# Patient Record
Sex: FEMALE | Race: WHITE | NOT HISPANIC OR LATINO | URBAN - METROPOLITAN AREA
[De-identification: names, ages, dates, MRNs, and addresses within clinical notes are randomized per-mention and may not be internally consistent; named-entity substitution may affect disease eponyms.]

---

## 2017-01-06 ENCOUNTER — TRANSFERRED RECORDS (OUTPATIENT)
Dept: HEALTH INFORMATION MANAGEMENT | Facility: CLINIC | Age: 57
End: 2017-01-06

## 2017-02-21 ENCOUNTER — TRANSFERRED RECORDS (OUTPATIENT)
Dept: HEALTH INFORMATION MANAGEMENT | Facility: CLINIC | Age: 57
End: 2017-02-21

## 2017-03-22 DIAGNOSIS — H53.40 VISUAL FIELD DEFECT: Primary | ICD-10-CM

## 2017-03-23 ENCOUNTER — OFFICE VISIT (OUTPATIENT)
Dept: OPHTHALMOLOGY | Facility: CLINIC | Age: 57
End: 2017-03-23
Attending: OPHTHALMOLOGY
Payer: COMMERCIAL

## 2017-03-23 DIAGNOSIS — H53.40 VISUAL FIELD DEFECT: ICD-10-CM

## 2017-03-23 PROCEDURE — 99215 OFFICE O/P EST HI 40 MIN: CPT | Mod: ZF

## 2017-03-23 PROCEDURE — 92083 EXTENDED VISUAL FIELD XM: CPT | Mod: ZF | Performed by: OPHTHALMOLOGY

## 2017-03-23 RX ORDER — RABEPRAZOLE SODIUM 20 MG/1
20 TABLET, DELAYED RELEASE ORAL DAILY
COMMUNITY

## 2017-03-23 ASSESSMENT — CUP TO DISC RATIO
OD_RATIO: 0.5
OS_RATIO: 0.5

## 2017-03-23 ASSESSMENT — TONOMETRY
OD_IOP_MMHG: 19
OS_IOP_MMHG: 20
IOP_METHOD: ICARE

## 2017-03-23 ASSESSMENT — VISUAL ACUITY
OD_CC: 20/50
OD_CC+: -1
OS_CC: 20/50
OS_CC+: -1
CORRECTION_TYPE: CONTACTS
METHOD: SNELLEN - LINEAR

## 2017-03-23 ASSESSMENT — CONF VISUAL FIELD
OS_INFERIOR_TEMPORAL_RESTRICTION: 1
OD_SUPERIOR_NASAL_RESTRICTION: 1
OS_SUPERIOR_TEMPORAL_RESTRICTION: 1
OD_INFERIOR_NASAL_RESTRICTION: 1

## 2017-03-23 ASSESSMENT — EXTERNAL EXAM - LEFT EYE: OS_EXAM: NORMAL

## 2017-03-23 ASSESSMENT — SLIT LAMP EXAM - LIDS
COMMENTS: NORMAL
COMMENTS: NORMAL

## 2017-03-23 ASSESSMENT — REFRACTION_WEARINGRX
OS_SPHERE: +2.75
SPECS_TYPE: CONTACTS
OD_SPHERE: +2.75

## 2017-03-23 ASSESSMENT — EXTERNAL EXAM - RIGHT EYE: OD_EXAM: NORMAL
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
## 2017-03-23 NOTE — MR AVS SNAPSHOT
After Visit Summary   3/23/2017    Ashley Connelly    MRN: [id number]           Patient Information     Date Of Birth          1960        Visit Information        Provider Department      3/23/2017 12:30 PM Manolo Benton MD [facility]        Today's Diagnoses     Visual field defect - Both Eyes           Follow-ups after your visit        Who to contact     Please call your clinic at [phone] to:    Ask questions about your health    Make or cancel appointments    Discuss your medicines    Learn about your test results    Speak to your doctor   If you have compliments or concerns about an experience at your clinic, or if you wish to file a complaint, please contact [facility] Patient Relations at [phone] or email us at [email or web address]         Additional Information About Your Visit        MyChart Information     mySocietyt gives you secure access to your electronic health record. If you see a primary care provider, you can also send messages to your care team and make appointments. If you have questions, please call your primary care clinic.  If you do not have a primary care provider, please call [phone] and they will assist you.      [employer] is an electronic gateway that provides easy, online access to your medical records. With [employer], you can request a clinic appointment, read your test results, renew a prescription or communicate with your care team.     To access your existing account, please contact your [facility] or call [phone] for assistance.        Care EveryWhere ID     This is your Care EveryWhere ID. This could be used by other organizations to access your [place] medical records  [id number]         Blood Pressure from Last 3 Encounters:   No data found for BP    Weight from Last 3 Encounters:   No data found for Wt              We Performed the Following     Functional  Vision Loss OU     IOP Measurement        Primary Care Provider Office Phone # Fax #    Jenise Mortensen [phone]       [facility] [address]  [facility] [address]        Thank you!     Thank you for choosing EYE CLINIC  for your care. Our goal is always to provide you with excellent care. Hearing back from our patients is one way we can continue to improve our services. Please take a few minutes to complete the written survey that you may receive in the mail after your visit with us. Thank you!             Your Updated Medication List - Protect others around you: Learn how to safely use, store and throw away your medicines at www.disposemymeds.org.          This list is accurate as of: 3/23/17 11:59 PM.  Always use your most recent med list.                   Brand Name Dispense Instructions for use    CITALOPRAM HYDROBROMIDE PO          ondansetron 2 MG/ML Soln    ZOFRAN     Inject 0.1 mg/kg into the vein every 6 hours as needed       RABEprazole 20 MG EC tablet    ACIPHEX     Take 20 mg by mouth daily       VITAMIN C PO          VITAMIN D (CHOLECALCIFEROL) PO      Take by mouth daily

## 2017-04-10 ENCOUNTER — TELEPHONE (OUTPATIENT)
Dept: OPHTHALMOLOGY | Facility: CLINIC | Age: 57
End: 2017-04-10

## 2017-04-10 NOTE — TELEPHONE ENCOUNTER
I called the patient and left a VM message.   She will call back if she has any questions.     Abelardo Pandey MD  Neuro-Ophthalmology Fellow

## 2017-04-10 NOTE — TELEPHONE ENCOUNTER
----- Message from WANG Chamorro sent at 3/27/2017  7:32 AM CDT -----  Regarding: FW: Pt request for results- [place]  Contact: [phone]  Can you call her please?  Thanks  ----- Message -----     From: Haley Dillon     Sent: 3/24/2017   1:53 PM       To: WANG Chamorro  Subject: Pt request for results- [place]               Pt requesting to discuss her test results and appt yesterday with Dr. Benton. Pt stated she has several questions which she didn't ask at her appt.    Pt can be reached at number above.    Thanks!- lel    Please DO NOT send this message and/or reply back to sender.  Call Center Representatives DO NOT respond to messages.

## 2017-04-26 ENCOUNTER — TELEPHONE (OUTPATIENT)
Dept: OPHTHALMOLOGY | Facility: CLINIC | Age: 57
End: 2017-04-26

## 2017-04-26 NOTE — TELEPHONE ENCOUNTER
Patient states she received quote [phone]), received bill and it was double - patient states that she received two bills   [facility]  Office outpatient visit -   Visual field exam -   [facility] care -   Adjustment -   ------------------------  Total: $491.78    [place]   Total hospital charges: $829.00  Insurance adjustment - 115.23    Upset because she was hoping to get more from appointment     Disability issues - denied twice  Social security - denied initially  Doesn't feel that she is able to work and was upset that doctor stated she is able.       I offered to send patient a copy of her records, fill out paperwork for disability/social security if she could have it faxed too us, and to submit an inquiry to our financial  to find out if there was a billing issue.

## 2017-04-26 NOTE — TELEPHONE ENCOUNTER
----- Message from Clement Grimm, COA sent at 4/24/2017  9:59 AM CDT -----  pls call pt when you get a chance, she refused to talk to me.

## 2017-05-02 ENCOUNTER — TELEPHONE (OUTPATIENT)
Dept: OPHTHALMOLOGY | Facility: CLINIC | Age: 57
End: 2017-05-02

## 2017-05-02 NOTE — TELEPHONE ENCOUNTER
[facility] bill  Office outpatient visit - $491.78  Visual field exam -   [facility] Lab care -   Adjustment -   ------------------------  Total:  $454.68     [place] bill  Total hospital charges: $829.00  Insurance adjustment: 115.23  ------------------------  Total: $713.77          Social security: denied twice, has to go to hearing; her  believes that they denied it twice because they probably thought that patient was going to regain vision; Now that she has shown no improvement she will probably be approved.   Will need our help when hearing gets closer to date, supporting evidence that patient vision loss is permanent and irreversible

## 2017-05-10 ENCOUNTER — TELEPHONE (OUTPATIENT)
Dept: OPHTHALMOLOGY | Facility: CLINIC | Age: 57
End: 2017-05-10

## 2017-07-29 ENCOUNTER — HEALTH MAINTENANCE LETTER (OUTPATIENT)
Age: 57
End: 2017-07-29

## 2018-01-31 ENCOUNTER — TELEPHONE (OUTPATIENT)
Dept: OPHTHALMOLOGY | Facility: CLINIC | Age: 58
End: 2018-01-31

## 2018-01-31 NOTE — TELEPHONE ENCOUNTER
----- Message from Clement Grimm COA sent at 1/30/2018  8:05 AM CST -----  Pt wanted you to call her back regarding a disability letter she needs.

## 2018-01-31 NOTE — TELEPHONE ENCOUNTER
Long term disability through her work; [employer] ([employer]  They need specifics, in order to keep disability, not enough medical information after 02/28/17  Letter is to go to insurance company:[employer] - [employer]; representative: Rhonda Gage  Fax: [phone]    Ph: [phone] ext:[phone]

## 2019-11-04 ENCOUNTER — HEALTH MAINTENANCE LETTER (OUTPATIENT)
Age: 59
End: 2019-11-04

## 2020-11-22 ENCOUNTER — HEALTH MAINTENANCE LETTER (OUTPATIENT)
Age: 60
End: 2020-11-22

## 2021-09-18 ENCOUNTER — HEALTH MAINTENANCE LETTER (OUTPATIENT)
Age: 61
End: 2021-09-18

## 2021-11-13 ENCOUNTER — HEALTH MAINTENANCE LETTER (OUTPATIENT)
Age: 61
End: 2021-11-13

## 2022-01-08 ENCOUNTER — HEALTH MAINTENANCE LETTER (OUTPATIENT)
Age: 62
End: 2022-01-08

## 2022-11-20 ENCOUNTER — HEALTH MAINTENANCE LETTER (OUTPATIENT)
Age: 62
End: 2022-11-20

## 2023-04-15 ENCOUNTER — HEALTH MAINTENANCE LETTER (OUTPATIENT)
Age: 63
End: 2023-04-15

## 2023-11-25 ENCOUNTER — HEALTH MAINTENANCE LETTER (OUTPATIENT)
Age: 63
End: 2023-11-25